# Patient Record
Sex: FEMALE | Race: WHITE | NOT HISPANIC OR LATINO | Employment: OTHER | URBAN - METROPOLITAN AREA
[De-identification: names, ages, dates, MRNs, and addresses within clinical notes are randomized per-mention and may not be internally consistent; named-entity substitution may affect disease eponyms.]

---

## 2018-06-18 ENCOUNTER — TELEPHONE (OUTPATIENT)
Dept: OTOLARYNGOLOGY | Facility: CLINIC | Age: 83
End: 2018-06-18

## 2018-06-18 NOTE — TELEPHONE ENCOUNTER
----- Message from Aranza Schwarz sent at 6/18/2018 11:08 AM CDT -----  Contact: Patients daughter    Needs Advice    Reason for call: Deja Gore patients daughter calling  In regards to referral information from Dr. Sugey Quiles for above patient. Patient is being referred for hoarseness.       Communication Preference: Deja Gore [phone]

## 2018-08-15 ENCOUNTER — OFFICE VISIT (OUTPATIENT)
Dept: OTOLARYNGOLOGY | Facility: CLINIC | Age: 83
End: 2018-08-15
Payer: MEDICARE

## 2018-08-15 ENCOUNTER — HOSPITAL ENCOUNTER (OUTPATIENT)
Dept: RADIOLOGY | Facility: HOSPITAL | Age: 83
Discharge: HOME | End: 2018-08-15
Attending: OTOLARYNGOLOGY
Payer: MEDICARE

## 2018-08-15 VITALS — SYSTOLIC BLOOD PRESSURE: 116 MMHG | HEART RATE: 100 BPM | DIASTOLIC BLOOD PRESSURE: 79 MMHG

## 2018-08-15 DIAGNOSIS — R49.0 DYSPHONIA: Primary | ICD-10-CM

## 2018-08-15 DIAGNOSIS — J38.5 LARYNGEAL SPASM: ICD-10-CM

## 2018-08-15 DIAGNOSIS — J34.2 NASAL SEPTAL DEVIATION: ICD-10-CM

## 2018-08-15 DIAGNOSIS — K21.9 LPRD (LARYNGOPHARYNGEAL REFLUX DISEASE): ICD-10-CM

## 2018-08-15 DIAGNOSIS — J34.89 NASAL OBSTRUCTION: ICD-10-CM

## 2018-08-15 DIAGNOSIS — J38.3 VOCAL FOLD ATROPHY: ICD-10-CM

## 2018-08-15 DIAGNOSIS — Q30.9 NASAL DEFORMITY, CONGENITAL: ICD-10-CM

## 2018-08-15 DIAGNOSIS — J32.9 CHRONIC RECURRENT SINUSITIS: Primary | ICD-10-CM

## 2018-08-15 DIAGNOSIS — J32.9 CHRONIC RECURRENT SINUSITIS: ICD-10-CM

## 2018-08-15 PROCEDURE — 99999 PR PBB SHADOW E&M-EST. PATIENT-LVL III: CPT | Mod: PBBFAC,,, | Performed by: OTOLARYNGOLOGY

## 2018-08-15 PROCEDURE — 99205 OFFICE O/P NEW HI 60 MIN: CPT | Mod: S$GLB,,, | Performed by: OTOLARYNGOLOGY

## 2018-08-15 PROCEDURE — 31579 LARYNGOSCOPY TELESCOPIC: CPT | Mod: S$GLB,,, | Performed by: OTOLARYNGOLOGY

## 2018-08-15 PROCEDURE — 70486 CT MAXILLOFACIAL W/O DYE: CPT | Mod: 26,,, | Performed by: RADIOLOGY

## 2018-08-15 PROCEDURE — 70486 CT MAXILLOFACIAL W/O DYE: CPT | Mod: TC

## 2018-08-15 PROCEDURE — 99499 UNLISTED E&M SERVICE: CPT | Mod: S$GLB,,, | Performed by: OTOLARYNGOLOGY

## 2018-08-15 RX ORDER — OLMESARTAN MEDOXOMIL 5 MG/1
5 TABLET ORAL DAILY
COMMUNITY

## 2018-08-15 RX ORDER — LEVOTHYROXINE SODIUM 25 UG/1
25 TABLET ORAL DAILY
COMMUNITY

## 2018-08-15 RX ORDER — CLOPIDOGREL BISULFATE 75 MG/1
75 TABLET ORAL DAILY
COMMUNITY

## 2018-08-15 RX ORDER — VENLAFAXINE 75 MG/1
75 TABLET ORAL 2 TIMES DAILY
COMMUNITY

## 2018-08-15 NOTE — PATIENT INSTRUCTIONS
VOCAL CORD BOWING or VOCAL CORD ATROPHY    What is bowing?    Bowed (or atrophic) vocal folds are vocal folds that look like two bows joined at the front and the back. While the front and back of the two cords come together, the middle stays open. The opening in the middle allows air to leak out during voice use, causing symptoms including weak voice and vocal fatigue.    Bowing is most often caused by presbylarynx, which means an aging larynx. However, it can also occur in young persons. Often, bowing comes from a lack of nerve input to the vocal folds, resulting in poor closure in the middle part of the cords. Over time, the muscle bulk in the vocal folds can be lost, adding to greater difficulty with closure.     How is bowing treated?    The first line of treatment for bowing is voice therapy. Therapy is used to exercise the vocal folds and to bulk them up to improve the closure. Therapy exercises may also work to reduce over-activity of the surrounding muscles, which often get over-used to compensate when the vocal folds arent closing well.    In extreme cases of vocal fold bowing, one of both vocal folds may be injected with material to add bulk to improve closure.             WHAT TO EXPECT FROM VOICE THERAPY    Purpose  The purpose of voice therapy is to help you find a better, more efficient way to use your voice or to reduce symptoms such as coughing, throat clearing, or difficulty breathing.  Depending on your symptoms, you may learn how to produce clearer voice quality, how to reduce fatigue or pain associated with speaking, how to take care of your voice, and how to eliminate chronic coughing or throat clearing.      Process: Evaluation  First, you may go through some initial testing.  In most cases, a videostroboscopy will be performed in order to allow your speech pathologist and your physician to look at your vocal cords to aid in deciding if you would benefit from voice therapy.  Next, you  may work with the speech pathologist to assess the current capabilities of your voice.  Following evaluation, your speech pathologist will design a therapeutic plan to improve your voice as well as other symptoms that may bother you.  At the time of evaluation, your speech pathologist may provide you with exercises to try at home.      Process: Therapy  Most patients benefit from 2-8 sessions over 1-3 months.  Voice therapy involves changing the behavior of your vocal cords and speaking habits, so it is very important that you attend your appointments and do home practices as instructed by your speech pathologist.  Home practice and participation in therapy are critical to meeting your desired voice goals, so of course, we are able to work with you to schedule appointments that are convenient for you.

## 2018-08-15 NOTE — LETTER
August 22, 2018      Sugey Quiles MD  [address]  [place] [address]           [facility]  [address] [address]  [facility] [address]  Phone: [phone]  Fax: [phone]          Patient: Alejandrina Francois   MR Number: [id number]   YOB: 1933   Date of Visit: 8/15/2018       Dear Dr. Sugey Quiles:    Thank you for referring Alejandrina Francois to me for evaluation. Attached you will find relevant portions of my assessment and plan of care.    If you have questions, please do not hesitate to call me. I look forward to following Alejandrina Francois along with you.    Sincerely,    Daniel Smiley MD    Enclosure  CC:  No Recipients    If you would like to receive this communication electronically, please contact [email or web address] or [phone] to request more information on [employer] access.    For providers and/or their staff who would like to refer a patient to [facility], please contact us through our one-stop-shop provider referral line, [facility], at [phone].    If you feel you have received this communication in error or would no longer like to receive these types of communications, please e-mail [email or web address]

## 2018-08-15 NOTE — PROGRESS NOTES
[facility]  Department of Otorhinolaryngology and Communication Sciences    Alejandrina Francois is a 84 y.o. female who [facility] the [facility] for consultation at the kind request of Dr. Sugey Quiles for further management of dysphonia.    She complains of hoarseness. She says it takes more energy to talk. People have difficulty understanding her. Onset was gradual. Symptoms are moderate. Duration is 1+ year. Time course is constant but variable. Symptoms are  worsening. Exacerbating factors include talking over the phone for a long time. Alleviating factors include not talking, resting her voice. Associated symptoms include throat clearing. There have not been prior episodes.  She has a hard time being heard over the phone.  She feels like her congestion/drainage has settled a little lower into her chest.  Has taken abx for her sinus infections.  Does have some tremor in her hands, as well as in her lip.  She has not seen a neurologist. Dr. Parisi ([place]) initially talked to her about reflux but also noted a vocal tremor. Saw Dr. Lehman earlier today regarding sinonasal symptoms.    She is not actively smoking. She denies otalgia, hemoptysis, hematemesis, fevers, chills, sweats, involuntary weight loss, neck mass.     She drinks 2 caffeinated beverage(s) per day. She does not drink a significant amount of plain water. She has become a little debilitated over the past few years    Voice Handicap Index-10 (VHI-10) score is 30.   Reflux Symptom Index (RSI) score is 19.   Eating Assessment Tool-10 (EAT-10) score is 2.   Dyspnea Index (DI) score is 2.  Cough Severity Index (CSI) score is 0.    Past Medical History  She has a past medical history of Cataract.    Reports chronic sinusitis  Mitral valve prolapse    Past Surgical History  She has a past surgical history that includes Appendectomy.   Cataract surgery    Family History  Her family history is not on file.    Social History  She reports that  has  never smoked. she has never used smokeless tobacco. She reports that she drinks about 1.2 oz of alcohol per week. She reports that she does not use drugs.    Allergies  She has No Known Allergies.    Medications  She has a current medication list which includes the following prescription(s): clopidogrel, levothyroxine, olmesartan, and venlafaxine.    Review of Systems   Constitutional: Negative for fever.   HENT: Positive for sore throat.    Eyes: Negative for visual disturbance.   Respiratory: Negative for wheezing.    Cardiovascular: Negative for chest pain.   Gastrointestinal: Negative for nausea.   Musculoskeletal: Positive for arthralgias.   Skin: Negative for rash.   Neurological: Positive for tremors.   Hematological: Bruises/bleeds easily.   Psychiatric/Behavioral: The patient is nervous/anxious.           Objective:     VS reviewed    Physical Exam    Constitutional: comfortable, well dressed  Psychiatric: appropriate affect  Respiratory: comfortably breathing, symmetric chest rise, no stridor  Voice: mild-moderate variable roughness/ohara, mild tremor on sustained phonation; stimulable to improved resonance with increased energy/breath support  Cardiovascular: upper extremities non-edematous  Lymphatic: no cervical lymphadenopathy  Neurologic: CN 3-12 grossly intact; + resting tremor of bilateral upper extremities  Head: normocephalic  Eyes: conjunctivae and sclerae clear  Ears: normal pinnae, normal external auditory canals, tympanic membranes intact  Nose: mucosa pink and noncongested, no masses, no mucopurulence, no polyps  Oral cavity / oropharynx: no mucosal lesions; + involuntary dyskinesia/resting tremor of tongue and pharyngeal musculature  Neck: soft, full range of motion, laryngotracheal complex palpable with appropriate landmarks, larynx elevates on swallowing  Indirect laryngoscopy: limited due to gag    Procedure  Flexible Laryngeal Videostroboscopy (23992): Laryngeal videostroboscopy is  indicated to assess laryngeal vibratory biomechanics and vocal fold oscillation, which cannot be assessed with a plain light examination. This was carried out transnasally with a distal chip videoendoscope. After verbal consent was obtained, the patient was positioned and the nose was topically decongested with 1% phenylephrine and topically anesthetized with 4% lidocaine. The endoscope was passed through the most patent nasal cavity and positioned to image the nasopharynx, larynx, and hypopharynx in detail. The following features were examined: nasopharyngeal, laryngeal, hypopharyngeal masses; velopharyngeal strength, closure, and symmetry of motion; vocal fold range and symmetry of motion; laryngeal mucosal edema, erythema, inflammation, and hydration; salivary pooling; and gross laryngeal sensation. During phonation, the vocal folds were assessed for glottal closure; mucosal wave; vocal fold lesions; vibratory periodicity, amplitude, and phase symmetry; and vertical height match. The equipment was removed. The patient tolerated the procedure well without complication. All findings were normal except:  - bilateral, symmetric, moderate vocal fold atrophy; mild glottal insufficiency/spindle shaped gap  - mild intrinsic respirtaory dyskinesia/tremor involving palate and glottis  - phonatory tremor, low amplitude, high frequency, involving BOT, pharynx, intrinsic glottis with supraglottic constriction       Assessment:     Alejandrina Francois is a 84 y.o. 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     Plan:        I had a discussion with the patient and her daughter regarding her condition and the further workup and management options.      The patient is reassured that these symptoms do not appear to represent a serious or threatening condition. Nevertheless, I did recommend she undergo comprehensive neurology evaluation to rule out any underlying organic cause for her generalized tremor  and to consider systemic pharmacological treatment. Should she desire improvement in her voice, it would begin with definitive SLP voice evaluation and voice therapy. Other options which might be considered, depending on her progress with therapy, might be injection augmentation or botox injection.    She will consider these options and will contact me in the future with her decision. Otherwise, she will follow up with me as needed.    All questions were answered, and the patient is in agreement with the above.     Daniel Smiley M.D.  [facility]  Department of Otorhinolaryngology and Communication Sciences

## 2018-08-16 NOTE — CONSULTS
Ms. Francois presents for consultation.    VITAL SIGNS:  Per nurses' notes.    CHIEF COMPLAINT:  Chronic sinus issues.    HISTORY OF PRESENT ILLNESS:  This is an 84-year-old white female who states she   has chronic sinusitis, throat hoarseness with chronic infections and drainage   and obstruction of her left nasal passageway.  Her NOSE score is 55.  She does   have a history of some sneezing, though she has not been allergy tested.  She   has been followed in [place], where they live in [place] by Dr. Fox Parisi.  She states that she does have approximately six to seven sinus   infections annually.  She states that she also has been told she has some reflux   issues as she was scoped approximately one year ago.  She does use sinus rinses   approximately three times per week.  She complains of nasal congestion and   obstruction, runny nose, postnasal drip, nosebleeds facial pain, facial   pressure, headaches, snoring, sore throat, tired during the day and sinus   infections.  She has no history of sinus surgery.    REVIEW OF SYSTEMS:  CONSTITUTIONAL: Weight loss or weight gain: Negative.  ALLERGY/IMMUNOLOGIC: Negative.  ENT/Mouth:  Hearing Loss/Dizziness/Tinnitus: Negative.  Ear Infections/Otalgia: Negative.  Rhinitis/Sinusitis/Epistaxis: Negative.  Headache/Facial Pain: Negative.  Nasal Obstruction/Snoring/EMILI: Negative.  Throat: Infections/Pain: Negative.  Hoarseness/Speech Disturbance: Negative.  Salivary Glands Disorder: Negative.  Trauma: Hx: Negative.    Cardiovascular:  MI/Angina: Negative.  Hypertension: Negative.  Endo: DM/Steroids: Negative.  Eyes: Negative.  GI: Dysphagia/Reflux: Negative.  : GYN Pregnancy: Negative.      Renal: Dialysis: Negative.  Lymph: Neck Mass/Lymphadenopathy: Negative.  Musculoskeletal: Negative.  Hem: Bleeding Disorders/Anemia: Negative.  Neuro: Cranial/Neuralgia: Negative.  Pulm: Asthma/SOB/Cough: Negative.  Skin/Breast: Negative.    PAST MEDICAL/FAMILY/SOCIAL  HISTORY:    Additional Past Medical History   ENT Surgery: Negative.   Occupational Exposure: Negative.    Problems: Negative.   Cancer: Negative.   Positive for mitral valve prolapse according to her daughter.  She does have a   history of a cataract removal, also history of arthritis, anemia, anxiety,   hypertension and the reflux disease.  Additional surgeries include cataract   surgery and appendectomy.  Again, she has had no sinus surgeries or nasal   surgery.    Past Family History   Family history hearing loss: Negative.   Family history cancer: Negative.    Past Social History   Tobacco: She is a nonsmoker.   Alcohol: She is a two glasses of wine per week drinker.    MEDICATIONS:  Per MedCard.    ALLERGIES:  Per MedCard.    EXAMINATION:  General Appearance:  Well-developed, well-nourished elderly 84-year-old white   female in no apparent distress.  Communication Ability:  Good.  EARS, NOSE, THROAT, MOUTH;  EARS: Clear.   External auditory canals: Clear.   Hearing: Grossly Intact.   Tympanic membranes: Clear.  NOSE:   External: Shows bilateral external valve collapse, positive Heather maneuver.   Intranasal: She has a significant anterior septal deviation and bowing to the   left side with 2+ turbinates, altogether creating 80% obstruction.  MOUTH:   Intraorally: Lips, teeth and gums: Normal.   Oropharynx: Normal.   Mucosa: Normal.  THROAT:   Tongue: Normal.   Palate: Normal.   Tonsils: Minimum.   Posterior pharynx: Normal.  HEAD/FACE INSPECTION: Normal and atraumatic.   Palpation/Percussion: She is nontender to palpation or percussion at this time.   Facial Strength: Normal and symmetric.   Salivary glands: Normal.    NECK: Supple.  THYROID: No masses.  LYMPHATICS: No nodes.  RESPIRATORY:   Effort: Normal.  EYES:   Ocular Mobility: Normal.   Vision: Grossly intact.  NEURO/PSYCH:   Cranial nerves: 2-12 grossly intact.   Orientation: x3.   Mood/Affect: Normal.    IMPRESSION:  An 84-year-old white  female with history of chronic recurrent sinus   infections six to seven per year treated with multiple antibiotics as well as   steroids.  She also has a history of reflux disease and in fact was prescribed   Protonix, though she did not take this.    RECOMMENDATION:  I have discussed my findings with her and her daughter in   detail.  I have discussed with her the use of sinus rinses, which she has been   doing utilizing distilled water and I would have her do this daily.  We will   also use Flonase regularly two puffs each nostril once a day.  We discussed a   trial of Breathe Right nasal strips.  She had been prescribed Singulair and I   have told her that she could continue this as needed.  We also discussed   over-the-counter omeprazole as well as I have given her reflux precautions and   reviewed these with her.  We will obtain a CT scan of her sinuses and she will   contact us after this is completed to arrange for followup.      HG/HN  dd: 08/15/2018 14:21:23 (CDT)  td: 08/16/2018 00:42:55 (CDT)  Doc ID   #[id number]  Job ID #[id number]    CC:

## 2018-08-22 ENCOUNTER — TELEPHONE (OUTPATIENT)
Dept: OTOLARYNGOLOGY | Facility: CLINIC | Age: 83
End: 2018-08-22

## 2018-08-22 NOTE — TELEPHONE ENCOUNTER
Discussed Doctor's findings with pt and pt's daughter Deja. Daughter states she would call back to schedule a follow-up visit.

## 2018-09-18 ENCOUNTER — TELEPHONE (OUTPATIENT)
Dept: OTOLARYNGOLOGY | Facility: CLINIC | Age: 83
End: 2018-09-18

## 2018-09-18 NOTE — TELEPHONE ENCOUNTER
----- Message from Arlene Vogel sent at 9/18/2018  1:35 PM CDT -----  Contact: patient daughter deja  [phone]-Deja-please call above patient daughter wants to get work up done for surgery in case they want to go that route call her at number in message

## 2020-12-16 ENCOUNTER — TELEPHONE (OUTPATIENT)
Dept: NEUROLOGY | Facility: CLINIC | Age: 85
End: 2020-12-16

## 2020-12-16 NOTE — TELEPHONE ENCOUNTER
----- Message from Carolina Darnell sent at 12/16/2020  8:11 AM CST -----  Regarding: PT  Contact: PT's Daughter Deja  PT's daughter called to get a release form so they can get the PT's records sent over from her other doctor. Please call back     Callback: [phone]

## 2021-01-05 ENCOUNTER — PATIENT MESSAGE (OUTPATIENT)
Dept: NEUROLOGY | Facility: CLINIC | Age: 86
End: 2021-01-05

## 2021-01-05 ENCOUNTER — TELEPHONE (OUTPATIENT)
Dept: NEUROLOGY | Facility: CLINIC | Age: 86
End: 2021-01-05

## 2021-01-22 ENCOUNTER — PATIENT MESSAGE (OUTPATIENT)
Dept: NEUROLOGY | Facility: CLINIC | Age: 86
End: 2021-01-22

## 2021-01-22 ENCOUNTER — OFFICE VISIT (OUTPATIENT)
Dept: NEUROLOGY | Facility: CLINIC | Age: 86
End: 2021-01-22
Payer: MEDICARE

## 2021-01-22 DIAGNOSIS — G20.A1 PARKINSON'S DISEASE: Primary | ICD-10-CM

## 2021-01-22 DIAGNOSIS — R13.10 DYSPHAGIA, UNSPECIFIED TYPE: ICD-10-CM

## 2021-01-22 PROCEDURE — 1159F MED LIST DOCD IN RCRD: CPT | Mod: 95,,, | Performed by: PSYCHIATRY & NEUROLOGY

## 2021-01-22 PROCEDURE — 99205 PR OFFICE/OUTPT VISIT, NEW, LEVL V, 60-74 MIN: ICD-10-PCS | Mod: 95,,, | Performed by: PSYCHIATRY & NEUROLOGY

## 2021-01-22 PROCEDURE — 1159F PR MEDICATION LIST DOCUMENTED IN MEDICAL RECORD: ICD-10-PCS | Mod: 95,,, | Performed by: PSYCHIATRY & NEUROLOGY

## 2021-01-22 PROCEDURE — 99205 OFFICE O/P NEW HI 60 MIN: CPT | Mod: 95,,, | Performed by: PSYCHIATRY & NEUROLOGY

## 2021-01-30 ENCOUNTER — PATIENT MESSAGE (OUTPATIENT)
Dept: NEUROLOGY | Facility: CLINIC | Age: 86
End: 2021-01-30

## 2021-05-17 ENCOUNTER — PATIENT MESSAGE (OUTPATIENT)
Dept: NEUROLOGY | Facility: CLINIC | Age: 86
End: 2021-05-17

## 2021-05-17 ENCOUNTER — TELEPHONE (OUTPATIENT)
Dept: NEUROLOGY | Facility: CLINIC | Age: 86
End: 2021-05-17

## 2021-06-02 ENCOUNTER — PATIENT MESSAGE (OUTPATIENT)
Dept: NEUROLOGY | Facility: CLINIC | Age: 86
End: 2021-06-02

## 2021-06-03 RX ORDER — CARBIDOPA AND LEVODOPA 25; 100 MG/1; MG/1
TABLET ORAL
COMMUNITY
Start: 2020-12-24 | End: 2021-06-03 | Stop reason: SDUPTHER

## 2021-06-03 RX ORDER — CARBIDOPA AND LEVODOPA 25; 100 MG/1; MG/1
1.5 TABLET ORAL 4 TIMES DAILY
Qty: 180 TABLET | Refills: 11 | Status: SHIPPED | OUTPATIENT
Start: 2021-06-03 | End: 2021-10-29

## 2021-06-08 ENCOUNTER — HOSPITAL ENCOUNTER (OUTPATIENT)
Dept: RADIOLOGY | Facility: HOSPITAL | Age: 86
Discharge: HOME | End: 2021-06-08
Attending: PSYCHIATRY & NEUROLOGY
Payer: MEDICARE

## 2021-06-08 ENCOUNTER — CLINICAL SUPPORT (OUTPATIENT)
Dept: REHABILITATION | Facility: HOSPITAL | Age: 86
End: 2021-06-08
Attending: PSYCHIATRY & NEUROLOGY
Payer: MEDICARE

## 2021-06-08 DIAGNOSIS — R13.12 OROPHARYNGEAL DYSPHAGIA: Primary | ICD-10-CM

## 2021-06-08 PROCEDURE — 74230 X-RAY XM SWLNG FUNCJ C+: CPT | Mod: 26,,, | Performed by: RADIOLOGY

## 2021-06-08 PROCEDURE — 74230 FL MODIFIED BARIUM SWALLOW SPEECH STUDY: ICD-10-PCS | Mod: 26,,, | Performed by: RADIOLOGY

## 2021-06-08 PROCEDURE — 92611 MOTION FLUOROSCOPY/SWALLOW: CPT | Mod: PN

## 2021-06-08 PROCEDURE — 92610 EVALUATE SWALLOWING FUNCTION: CPT | Mod: PN,59

## 2021-06-08 PROCEDURE — 74230 X-RAY XM SWLNG FUNCJ C+: CPT | Mod: TC

## 2021-07-09 ENCOUNTER — OFFICE VISIT (OUTPATIENT)
Dept: NEUROLOGY | Facility: CLINIC | Age: 86
End: 2021-07-09
Payer: MEDICARE

## 2021-07-09 VITALS — SYSTOLIC BLOOD PRESSURE: 145 MMHG | HEART RATE: 98 BPM | HEIGHT: 64 IN | DIASTOLIC BLOOD PRESSURE: 88 MMHG

## 2021-07-09 DIAGNOSIS — G20.A1 PARKINSON'S DISEASE: Primary | ICD-10-CM

## 2021-07-09 PROCEDURE — 3288F FALL RISK ASSESSMENT DOCD: CPT | Mod: CPTII,S$GLB,, | Performed by: PSYCHIATRY & NEUROLOGY

## 2021-07-09 PROCEDURE — 1159F PR MEDICATION LIST DOCUMENTED IN MEDICAL RECORD: ICD-10-PCS | Mod: S$GLB,,, | Performed by: PSYCHIATRY & NEUROLOGY

## 2021-07-09 PROCEDURE — 1100F PTFALLS ASSESS-DOCD GE2>/YR: CPT | Mod: CPTII,S$GLB,, | Performed by: PSYCHIATRY & NEUROLOGY

## 2021-07-09 PROCEDURE — 99999 PR PBB SHADOW E&M-EST. PATIENT-LVL III: ICD-10-PCS | Mod: PBBFAC,,, | Performed by: PSYCHIATRY & NEUROLOGY

## 2021-07-09 PROCEDURE — 99214 OFFICE O/P EST MOD 30 MIN: CPT | Mod: S$GLB,,, | Performed by: PSYCHIATRY & NEUROLOGY

## 2021-07-09 PROCEDURE — 1100F PR PT FALLS ASSESS DOC 2+ FALLS/FALL W/INJURY/YR: ICD-10-PCS | Mod: CPTII,S$GLB,, | Performed by: PSYCHIATRY & NEUROLOGY

## 2021-07-09 PROCEDURE — 1159F MED LIST DOCD IN RCRD: CPT | Mod: S$GLB,,, | Performed by: PSYCHIATRY & NEUROLOGY

## 2021-07-09 PROCEDURE — 99214 PR OFFICE/OUTPT VISIT, EST, LEVL IV, 30-39 MIN: ICD-10-PCS | Mod: S$GLB,,, | Performed by: PSYCHIATRY & NEUROLOGY

## 2021-07-09 PROCEDURE — 1126F AMNT PAIN NOTED NONE PRSNT: CPT | Mod: S$GLB,,, | Performed by: PSYCHIATRY & NEUROLOGY

## 2021-07-09 PROCEDURE — 99999 PR PBB SHADOW E&M-EST. PATIENT-LVL III: CPT | Mod: PBBFAC,,, | Performed by: PSYCHIATRY & NEUROLOGY

## 2021-07-09 PROCEDURE — 3288F PR FALLS RISK ASSESSMENT DOCUMENTED: ICD-10-PCS | Mod: CPTII,S$GLB,, | Performed by: PSYCHIATRY & NEUROLOGY

## 2021-07-09 PROCEDURE — 1126F PR PAIN SEVERITY QUANTIFIED, NO PAIN PRESENT: ICD-10-PCS | Mod: S$GLB,,, | Performed by: PSYCHIATRY & NEUROLOGY

## 2021-08-02 ENCOUNTER — PATIENT MESSAGE (OUTPATIENT)
Dept: NEUROLOGY | Facility: CLINIC | Age: 86
End: 2021-08-02

## 2021-09-03 ENCOUNTER — PATIENT MESSAGE (OUTPATIENT)
Dept: NEUROLOGY | Facility: CLINIC | Age: 86
End: 2021-09-03

## 2021-09-10 ENCOUNTER — PATIENT MESSAGE (OUTPATIENT)
Dept: NEUROLOGY | Facility: CLINIC | Age: 86
End: 2021-09-10

## 2021-09-10 DIAGNOSIS — F41.9 ANXIETY: Primary | ICD-10-CM

## 2021-09-13 RX ORDER — ALPRAZOLAM 0.25 MG/1
0.25 TABLET ORAL
Qty: 30 TABLET | Refills: 0 | Status: SHIPPED | OUTPATIENT
Start: 2021-09-13 | End: 2022-04-05 | Stop reason: SDUPTHER

## 2021-10-02 ENCOUNTER — PATIENT MESSAGE (OUTPATIENT)
Dept: NEUROLOGY | Facility: CLINIC | Age: 86
End: 2021-10-02

## 2021-10-29 ENCOUNTER — OFFICE VISIT (OUTPATIENT)
Dept: NEUROLOGY | Facility: CLINIC | Age: 86
End: 2021-10-29
Payer: MEDICARE

## 2021-10-29 DIAGNOSIS — G20.A1 PARKINSON'S DISEASE: ICD-10-CM

## 2021-10-29 PROCEDURE — 99214 OFFICE O/P EST MOD 30 MIN: CPT | Mod: 95,,, | Performed by: PSYCHIATRY & NEUROLOGY

## 2021-10-29 PROCEDURE — 99214 PR OFFICE/OUTPT VISIT, EST, LEVL IV, 30-39 MIN: ICD-10-PCS | Mod: 95,,, | Performed by: PSYCHIATRY & NEUROLOGY

## 2021-10-29 RX ORDER — CARBIDOPA AND LEVODOPA 50; 200 MG/1; MG/1
1 TABLET, EXTENDED RELEASE ORAL NIGHTLY
Qty: 30 TABLET | Refills: 11 | Status: SHIPPED | OUTPATIENT
Start: 2021-10-29 | End: 2021-11-30

## 2021-10-29 RX ORDER — CARBIDOPA AND LEVODOPA 25; 100 MG/1; MG/1
2 TABLET ORAL 4 TIMES DAILY
Qty: 180 TABLET | Refills: 11 | Status: SHIPPED | OUTPATIENT
Start: 2021-10-29 | End: 2021-11-30

## 2021-11-12 ENCOUNTER — TELEPHONE (OUTPATIENT)
Dept: NEUROLOGY | Facility: CLINIC | Age: 86
End: 2021-11-12
Payer: MEDICARE

## 2021-11-12 ENCOUNTER — PATIENT MESSAGE (OUTPATIENT)
Dept: NEUROLOGY | Facility: CLINIC | Age: 86
End: 2021-11-12
Payer: MEDICARE

## 2021-11-12 RX ORDER — DONEPEZIL HYDROCHLORIDE 5 MG/1
5 TABLET, FILM COATED ORAL NIGHTLY
Qty: 30 TABLET | Refills: 11 | Status: SHIPPED | OUTPATIENT
Start: 2021-11-12 | End: 2022-11-12

## 2021-11-30 ENCOUNTER — OFFICE VISIT (OUTPATIENT)
Dept: NEUROLOGY | Facility: CLINIC | Age: 86
End: 2021-11-30
Payer: MEDICARE

## 2021-11-30 VITALS — SYSTOLIC BLOOD PRESSURE: 131 MMHG | HEART RATE: 99 BPM | DIASTOLIC BLOOD PRESSURE: 84 MMHG

## 2021-11-30 DIAGNOSIS — G20.A1 PARKINSON'S DISEASE: ICD-10-CM

## 2021-11-30 DIAGNOSIS — F41.9 ANXIETY: ICD-10-CM

## 2021-11-30 PROCEDURE — 99215 PR OFFICE/OUTPT VISIT, EST, LEVL V, 40-54 MIN: ICD-10-PCS | Mod: S$GLB,,, | Performed by: PSYCHIATRY & NEUROLOGY

## 2021-11-30 PROCEDURE — 99215 OFFICE O/P EST HI 40 MIN: CPT | Mod: S$GLB,,, | Performed by: PSYCHIATRY & NEUROLOGY

## 2021-11-30 PROCEDURE — 99999 PR PBB SHADOW E&M-EST. PATIENT-LVL II: CPT | Mod: PBBFAC,,, | Performed by: PSYCHIATRY & NEUROLOGY

## 2021-11-30 PROCEDURE — 99999 PR PBB SHADOW E&M-EST. PATIENT-LVL II: ICD-10-PCS | Mod: PBBFAC,,, | Performed by: PSYCHIATRY & NEUROLOGY

## 2021-11-30 RX ORDER — CARBIDOPA AND LEVODOPA 25; 100 MG/1; MG/1
2 TABLET ORAL
Qty: 180 TABLET | Refills: 11 | Status: SHIPPED | OUTPATIENT
Start: 2021-11-30

## 2022-02-11 ENCOUNTER — PATIENT MESSAGE (OUTPATIENT)
Dept: NEUROLOGY | Facility: CLINIC | Age: 87
End: 2022-02-11
Payer: MEDICARE

## 2022-02-21 ENCOUNTER — PATIENT MESSAGE (OUTPATIENT)
Dept: NEUROLOGY | Facility: CLINIC | Age: 87
End: 2022-02-21
Payer: MEDICARE

## 2022-03-28 ENCOUNTER — OFFICE VISIT (OUTPATIENT)
Dept: NEUROLOGY | Facility: CLINIC | Age: 87
End: 2022-03-28
Payer: MEDICARE

## 2022-03-28 DIAGNOSIS — G20.A1 PARKINSON'S DISEASE: ICD-10-CM

## 2022-03-28 PROCEDURE — 99214 PR OFFICE/OUTPT VISIT, EST, LEVL IV, 30-39 MIN: ICD-10-PCS | Mod: 95,,, | Performed by: PSYCHIATRY & NEUROLOGY

## 2022-03-28 PROCEDURE — 99214 OFFICE O/P EST MOD 30 MIN: CPT | Mod: 95,,, | Performed by: PSYCHIATRY & NEUROLOGY

## 2022-03-28 NOTE — ASSESSMENT & PLAN NOTE
Typical iPD, dosed with carbidopa/levodopa 25/100mg 2/2/1.5/1.5 tab       Typical iPD, dosed with carbidopa/levodopa 25/100mg 2/2/1.5/1.5 tab   Suggested decrease morning carbidopa/levodopa  To 1.5tabs to avoid AM nausea

## 2022-03-28 NOTE — PROGRESS NOTES
The patient location is: home  The chief complaint leading to consultation is: iPD    Visit type: audiovisual    Face to Face time with patient: 20mins  20 minutes of total time spent on the encounter, which includes face to face time and non-face to face time preparing to see the patient (eg, review of tests), Obtaining and/or reviewing separately obtained history, Documenting clinical information in the electronic or other health record, Independently interpreting results (not separately reported) and communicating results to the patient/family/caregiver, or Care coordination (not separately reported).         Each patient to whom he or she provides medical services by telemedicine is:  (1) informed of the relationship between the physician and patient and the respective role of any other health care provider with respect to management of the patient; and (2) notified that he or she may decline to receive medical services by telemedicine and may withdraw from such care at any time.    Notes:       [facility]     PCP/Referring Provider: No referring provider defined for this encounter.  Date of Service: 3/28/2022    Chief Complaint: PD    Interval Hx    Since last visit,   Overall stable  Taking carbidopa/levodopa 25/100mg 2/2/1.5/1.5tabs Q4H  ONtime near constant  Mild AM nausea    Walking still poor  No more falls- using a rollator  Uses a wheelchair for distance    No syncopes    Mild shuffling  Home PT once a week stopped after she felt it didn't help  One PM episode of confusion  No notable hallucinations  More PM talking in her sleep    Anxiety better    PD Review of Symptoms:  Anosmia: yes  Dysarthria/Hypophonia: yes  Dysphagia/Sialorrhea: yes  Depression: Anxious  Cognitive slowing: mild confusion at times  Hallucinations: none  Impulsivity:  none  Urinary changes: none  Constipation: Better with power pudding  Orthostasis: yes at times   Dyskinesia: none  Falls: none  Freezing:  "none  Micrographia: yes  Sleep issues:  -EMILI: none  -RBD: yes    "priorHPI: Alejandrina Francois is a R HANDED 88 y.o. female with a medical issues significant for depression, hip surgery, hypothyroidism, CHF, with PDism coming for eval. She started with a lip tremor 2017 and vocal hoarseness. Since 2020 her gait was starting to slow. Has declined in the last year to using a rollator. Only fell once. At this point she can walk 1/2 a block before she feels tired. She has unclear ONtime.    Medication history:  Started carbidopa/levodopa 25/100mg 1 tab PO TID worked well starting 2018  Recently changed from carbidopa/levodopa carbidopa/levodopa 25/100mg 1.5 tab PO TID to a dopamine agonist (unknown name or dose)  Felt nauseous and stopped"    Neuroleptic exposure:   None    Current Living Situation: home        Review of Systems:   Review of Systems   Constitutional: Negative for fever.   HENT: Negative for congestion.    Eyes: Negative for double vision.   Respiratory: Negative for cough and shortness of breath.    Cardiovascular: Negative for chest pain and leg swelling.   Gastrointestinal: Positive for nausea.   Genitourinary: Negative for dysuria.   Musculoskeletal: Positive for falls.   Skin: Negative for rash.   Neurological: Positive for tremors, sensory change and speech change. Negative for headaches.   Psychiatric/Behavioral: Negative for depression. The patient is nervous/anxious.          Current Medications:  Outpatient Encounter Medications as of 3/28/2022   Medication Sig Dispense Refill    ALPRAZolam (XANAX) 0.25 MG tablet Take 1 tablet (0.25 mg total) by mouth every 24 hours as needed for Anxiety. 30 tablet 0    carbidopa-levodopa  mg (SINEMET)  mg per tablet Take 2 tablets by mouth 5 (five) times daily. 180 tablet 11    clopidogrel (PLAVIX) 75 mg tablet Take 75 mg by mouth once daily.      donepeziL (ARICEPT) 5 MG tablet Take 1 tablet (5 mg total) by mouth every evening. (Patient not taking: " Reported on 11/30/2021) 30 tablet 11    levothyroxine (SYNTHROID) 25 MCG tablet Take 25 mcg by mouth once daily.      olmesartan (BENICAR) 5 MG Tab Take 5 mg by mouth once daily.      venlafaxine (EFFEXOR) 75 MG tablet Take 75 mg by mouth 2 (two) times daily.       No facility-administered encounter medications on file as of 3/28/2022.       Past Medical History:  Patient Active Problem List   Diagnosis    Parkinson's disease    Dysphagia    Anxiety       Past Surgical History:  Past Surgical History:   Procedure Laterality Date    APPENDECTOMY         Social:  Social History     Socioeconomic History    Marital status:    Tobacco Use    Smoking status: Never Smoker    Smokeless tobacco: Never Used   Substance and Sexual Activity    Alcohol use: Yes     Alcohol/week: 2.0 standard drinks     Types: 2 Glasses of wine per week    Drug use: No       Family History:  No family history on file.    PHYSICAL:  There were no vitals taken for this visit.    Physical Exam  Constitutional: Well-developed, well-nourished, appears stated age  Eyes: No scleral icterus  ENT: Moist oral mucosa  Cardiovascular: No lower extremity edema   Respiratory: No labored breathing   Skin: No rash   Hematologic: No bruising  Other: GI/ deferred   · Mental status: Alert and oriented to person, place, time, and situation;   · Speech: normal (not dysarthric), no aphasia  · Cranial nerves:            · CN II: Pupils mid-position and equal, not tested light or accommodation  · CN III, IV, VI: Extraocular movements full, no nystagmus visualized  · CN V: Not tested   · CN VII: Face strong and symmetric bilaterally   · CN VIII: Hearing intact to voice and conversation   · CN IX, X: Palate raises midline and symmetric   · CN XI: Strong shoulder shrug B/L  · CN XII: Tongue appears midline   · Motor: Normal bulk by appearance, no drift   · Sensory: Not tested    · Gait: mod shuffle- some initiation issues  · Deep tendon reflexes: Not  tested  · Movement/Coordination                    Mild hypophonic speech.                     Mild facial masking.                   No tremor with rest, posture, kinesis, or intention.                     No other dystonia, chorea, athetosis, myoclonus, or tics visualized.    Bradykinesia  ? Finger taps Finger flicks MARCO Heel taps   Left 2+ 1+ 0 0   Right 1+ 1+ 0 0       Laboratory Data:  NA    Imaging:  NA    Assessment//Plan:   Problem List Items Addressed This Visit        Neuro    Parkinson's disease    Current Assessment & Plan     Typical iPD, dosed with carbidopa/levodopa 25/100mg 2/2/1.5/1.5 tab       Typical iPD, dosed with carbidopa/levodopa 25/100mg 2/2/1.5/1.5 tab   Suggested decrease morning carbidopa/levodopa  To 1.5tabs to avoid AM nausea                           Malena Torres MD, MS  [facility]  Department of Neurology  Movement Disorders

## 2022-04-05 ENCOUNTER — PATIENT MESSAGE (OUTPATIENT)
Dept: NEUROLOGY | Facility: CLINIC | Age: 87
End: 2022-04-05
Payer: MEDICARE

## 2022-04-05 DIAGNOSIS — F41.9 ANXIETY: ICD-10-CM

## 2022-04-05 RX ORDER — ALPRAZOLAM 0.25 MG/1
0.25 TABLET ORAL
Qty: 30 TABLET | Refills: 0 | Status: SHIPPED | OUTPATIENT
Start: 2022-04-05 | End: 2022-05-05

## 2022-05-05 ENCOUNTER — PATIENT MESSAGE (OUTPATIENT)
Dept: RESEARCH | Facility: HOSPITAL | Age: 87
End: 2022-05-05
Payer: MEDICARE

## 2024-05-22 ENCOUNTER — PATIENT MESSAGE (OUTPATIENT)
Dept: NEUROLOGY | Facility: CLINIC | Age: 89
End: 2024-05-22
Payer: MEDICARE